# Patient Record
(demographics unavailable — no encounter records)

---

## 2024-10-08 NOTE — END OF VISIT
[FreeTextEntry3] : By signing my name below, I, Mewestpawan Valentin, attest that this documentation has been prepared under the direction and in the presence of Fiorella Bruno MD in the following sections HISTORY OF PRESENT ILLNESS; PAST MEDICAL/FAMILY/SOCIAL HISTORY; REVIEW OF SYSTEMS; PHYSICAL EXAM; ASSESSMENT/PLAN.

## 2024-10-08 NOTE — HISTORY OF PRESENT ILLNESS
[de-identified] : Ms. GORDY NGUYEN is a 61-year-old female presenting for initial consultation regarding 1.1 cm pancreatic uncinate process cyst dating back to .  Pancreas WNL on 2021 abdominal MRI; pancreatic cyst retrospectively visualized.  Patient with history of a pancreatic cyst initially noted on 2023 MR angio A/P performed for history of AAA dissection. 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  Surveillance MRCP 2024 demonstrated a 1.1 cm complex cyst in the pancreatic uncinate process containing internal septations, stable since 2023. Likely small SB IPMN. No pancreatic ductal dilatation.  Patient presents for initial consultation. She denies unintentional weight loss. Reports GERD symptoms for which she follows with GI. Symptoms relieved with pantoprazole. Undergoes MRIs with Dr. Ortiz for AAA surveillance.  PMHx: HTN, HLD, hypothyroidism, prediabetes, GERD, pancreatic cyst, thyroid nodules, infrarenal aortic dissection (follows with vascular Dr. Ortiz) No history of pancreatitis, jaundice. PSHx:  x2, foot surgery, uterine polypectomy Meds: amlodipine, metoprolol, ASA 81mg every other day, simvastatin, levothyroxine, pantoprazole, vitamins, cinnamon supplements No FMHx of PDAC. Social: Nonsmoker, nondrinker, no recreational drug use.

## 2024-10-08 NOTE — HISTORY OF PRESENT ILLNESS
[de-identified] : Ms. GORDY NGUYEN is a 61-year-old female presenting for initial consultation regarding 1.1 cm pancreatic uncinate process cyst dating back to .  Pancreas WNL on 2021 abdominal MRI; pancreatic cyst retrospectively visualized.  Patient with history of a pancreatic cyst initially noted on 2023 MR angio A/P performed for history of AAA dissection. 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  Surveillance MRCP 2024 demonstrated a 1.1 cm complex cyst in the pancreatic uncinate process containing internal septations, stable since 2023. Likely small SB IPMN. No pancreatic ductal dilatation.  Patient presents for initial consultation. She denies unintentional weight loss. Reports GERD symptoms for which she follows with GI. Symptoms relieved with pantoprazole. Undergoes MRIs with Dr. Ortiz for AAA surveillance.  PMHx: HTN, HLD, hypothyroidism, prediabetes, GERD, pancreatic cyst, thyroid nodules, infrarenal aortic dissection (follows with vascular Dr. Ortiz) No history of pancreatitis, jaundice. PSHx:  x2, foot surgery, uterine polypectomy Meds: amlodipine, metoprolol, ASA 81mg every other day, simvastatin, levothyroxine, pantoprazole, vitamins, cinnamon supplements No FMHx of PDAC. Social: Nonsmoker, nondrinker, no recreational drug use.

## 2024-10-08 NOTE — ASSESSMENT
[FreeTextEntry1] : 61F with a 1.1 cm pancreatic uncinate process cyst dating back to 2020.  Pancreas WNL on 08/17/2021 abdominal MRI; pancreatic cyst retrospectively visualized. 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 Surveillance MRCP 09/25/2024 demonstrated a 1.1 cm complex cyst in the pancreatic uncinate process containing internal septations, stable since 9/19/2023. Likely small SB IPMN. No pancreatic ductal dilatation.  I reviewed her recent and past imaging and discussed that her cyst appears present since imaging dating back to 2020.  I discussed that pancreatic cysts can have a range of pathologies and although most are benign, there is a risk of malignancy. We discussed that the relatively small size, absence of main duct involvement, and lack of solid components of the patient's cysts are reassuring, but there is still a small risk developing pancreatic cancer. Size greater than 3 cm represents a worrisome feature. This risk warrants continued surveillance as long as she is healthy enough to pursue any therapy- such as resection, chemotherapy, or radiation.   I discussed symptoms that would raise my concern for malignant transformation including weight loss, jaundice, abdominal/ back pain, and new onset diabetes. Should these develop, she should call immediately.  In the absence of new symptoms, I recommend continued surveillance with annual MRI. I discussed that at some point in the future it may be worthwhile to consider endoscopic ultrasound as this modality may identify findings not apparent on MRI- such as occult mural nodules. I do not think this is necessary in the near future. I also discussed that our surveillance recommendations may change over time as the literature continues to evolve.  Plan: Repeat MRI in 1 year F/u with Fiorella Rodarte after imaging Coordinate with vascular study in 2025 (MRI to be ordered by Dr. Otriz)

## 2024-10-08 NOTE — ASSESSMENT
[FreeTextEntry1] : 61F with a 1.1 cm pancreatic uncinate process cyst dating back to 2020.  Pancreas WNL on 08/17/2021 abdominal MRI; pancreatic cyst retrospectively visualized. 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 Surveillance MRCP 09/25/2024 demonstrated a 1.1 cm complex cyst in the pancreatic uncinate process containing internal septations, stable since 9/19/2023. Likely small SB IPMN. No pancreatic ductal dilatation.  I reviewed her recent and past imaging and discussed that her cyst appears present since imaging dating back to 2020.  I discussed that pancreatic cysts can have a range of pathologies and although most are benign, there is a risk of malignancy. We discussed that the relatively small size, absence of main duct involvement, and lack of solid components of the patient's cysts are reassuring, but there is still a small risk developing pancreatic cancer. Size greater than 3 cm represents a worrisome feature. This risk warrants continued surveillance as long as she is healthy enough to pursue any therapy- such as resection, chemotherapy, or radiation.   I discussed symptoms that would raise my concern for malignant transformation including weight loss, jaundice, abdominal/ back pain, and new onset diabetes. Should these develop, she should call immediately.  In the absence of new symptoms, I recommend continued surveillance with annual MRI. I discussed that at some point in the future it may be worthwhile to consider endoscopic ultrasound as this modality may identify findings not apparent on MRI- such as occult mural nodules. I do not think this is necessary in the near future. I also discussed that our surveillance recommendations may change over time as the literature continues to evolve.  Plan: Repeat MRI in 1 year F/u with Fiorella Rodarte after imaging Coordinate with vascular study in 2025 (MRI to be ordered by Dr. Ortiz)

## 2024-11-25 NOTE — HISTORY OF PRESENT ILLNESS
[FreeTextEntry1] : Patient presents today for CPE.\par   [de-identified] : Patient has been in good overall health this past year. She and her family recently bought a home in [email or web address]--will be closing on their home this month. They were previously renting for the past 13 years.  She is very excited to own her home.  She has mild recurrent GERD -resolved with Pantoprazole--now uses this only prn.  PMH notable for: Pt was diagnosed by US in 2021 to have a small dissection of distal aorta on abdominal US-then confirmed by CTA.  She had  an evaluation with the vascular surgeon -Dr. Ortiz who recommended starting treatment for hypertension and starting Aspirin 81mg qd and follow up scans q 2 years.  She was also diagnosed this past year with a pancreatic cystic lesion--saw Surgical oncologist last month--will get yearly MRI and follow up for this.  Pt feels well-is tolerating Toprol XL 25mg qd.  She had a follow up evaluation in late 8/2020 and BP reading was noted to be high 153/100-169/100 during her visit.  She started amlodipine along with the Tiprol XL 25mg nd BP readings have been normal at home on the Toprol XL 25mg.   She denies headaches or palpitations or chest pressure.

## 2024-11-25 NOTE — HEALTH RISK ASSESSMENT
[Good] : ~his/her~  mood as  good [Never (0 pts)] : Never (0 points) [No] : In the past 12 months have you used drugs other than those required for medical reasons? No [0] : 2) Feeling down, depressed, or hopeless: Not at all (0) [Patient reported mammogram was normal] : Patient reported mammogram was normal [Patient reported PAP Smear was normal] : Patient reported PAP Smear was normal [Patient reported bone density results were abnormal] : Patient reported bone density results were abnormal [Patient reported colonoscopy was normal] : Patient reported colonoscopy was normal [HIV test declined] : HIV test declined [Hepatitis C test declined] : Hepatitis C test declined [Never] : Never [de-identified] : vascular surgeon Dr. Ortiz q 2years with MRI and now will see surgical oncologist q year with MRI. [Audit-CScore] : 0 [de-identified] : walks 4 miles outdoors 5 days a week [de-identified] : overall healthy balanced and varied diet with NO red meat/no pork [[id number]] : 0 [MammogramDate] : 01/24 [MammogramComments] : at [facility] w prabha [PapSmearDate] : 12/23 [PapSmearComments] : with Dr. Jama- ricarda miller [BoneDensityDate] : 01/24 [BoneDensityComments] : T scores were -2.2 and -1.7 [ColonoscopyDate] : 10/20 [ColonoscopyComments] : Dr. Noman Suarez-rec 5 yr follow up

## 2024-11-25 NOTE — ASSESSMENT
[FreeTextEntry1] : pt had flu shot and COVID booster shot this season. She had series of 2 Shingrix vaccines.